# Patient Record
Sex: FEMALE | Race: WHITE | NOT HISPANIC OR LATINO | URBAN - METROPOLITAN AREA
[De-identification: names, ages, dates, MRNs, and addresses within clinical notes are randomized per-mention and may not be internally consistent; named-entity substitution may affect disease eponyms.]

---

## 2022-12-12 ENCOUNTER — APPOINTMENT (OUTPATIENT)
Dept: URBAN - METROPOLITAN AREA CLINIC 204 | Age: 37
Setting detail: DERMATOLOGY
End: 2022-12-12

## 2022-12-12 DIAGNOSIS — Z41.9 ENCOUNTER FOR PROCEDURE FOR PURPOSES OTHER THAN REMEDYING HEALTH STATE, UNSPECIFIED: ICD-10-CM

## 2022-12-12 PROCEDURE — OTHER SCITON BBL: OTHER

## 2022-12-12 PROCEDURE — OTHER SCITON MOXI: OTHER

## 2022-12-12 PROCEDURE — OTHER PLATELET RICH PLASMA INJECTION: OTHER

## 2022-12-12 ASSESSMENT — LOCATION DETAILED DESCRIPTION DERM
LOCATION DETAILED: LEFT FOREHEAD
LOCATION DETAILED: LEFT CENTRAL MALAR CHEEK
LOCATION DETAILED: RIGHT CENTRAL MALAR CHEEK
LOCATION DETAILED: SUPERIOR MID FOREHEAD

## 2022-12-12 ASSESSMENT — LOCATION SIMPLE DESCRIPTION DERM
LOCATION SIMPLE: LEFT FOREHEAD
LOCATION SIMPLE: RIGHT CHEEK
LOCATION SIMPLE: SUPERIOR FOREHEAD
LOCATION SIMPLE: LEFT CHEEK

## 2022-12-12 ASSESSMENT — LOCATION ZONE DERM: LOCATION ZONE: FACE

## 2022-12-12 NOTE — PROCEDURE: SCITON MOXI
Energy (J/Cm2): 20
Treatment Number: 1
Level 3
Laser Type: Fractionated 1927nm, thulium laser
Post-Care Instructions: I reviewed with the patient in detail post-care instructions.  Recommended diligent sun protection and sun avoidance.
Pre=procedure Text: After consent was obtained, the treatment areas were cleaned and treated using the parameters noted above.
Total Accumulated Energy (J): 4488
Detail Level: Detailed
Length Of Topical Anesthesia Application (Optional): 60 minutes
Topical Anesthesia?: 23% lidocaine, 7% tetracaine
Consent: Written consent obtained.  Risks were reviewed including but not limited to crusting, scabbing, blistering, scarring, darker or lighter pigmentary change, incomplete improvement, prolonged erythema and facial swelling, infection, and bleeding.
Price (Use Numbers Only, No Special Characters Or $): 400.00
% Per Pass: 3.3
Number Of Passes: 6

## 2022-12-12 NOTE — PROCEDURE: PLATELET RICH PLASMA INJECTION
Amount Injected At This Location In Cc (Will Not Render If 0): 0
Consent: Written consent obtained, risks reviewed including but not limited to pain, scarring, infection and incomplete improvement.  Patient understands the procedure is cosmetic in nature and will require out of pocket payment.
Location #1: under eyes
Amount Of Blood Collected (Optional - Include Units): 11ml
Site Of Venipuncture?: left ac
Detail Level: Zone
Post-Care Instructions: After the procedure, take precautions agains sun exposure. Do not apply sunscreen for 12 hours after the procedure. Do not apply make-up for 12 hours after the procedure. Avoid alcohol based toners for 10-14 days. After 2-3 days patients can return to their regular skin regimen.
Standard Default Technique For Making Prp: Blood collected and placed in centrifuge, opposite balance tube. Spin time of 10 minutes at default speed.
Depth In Mm (Will Not Render If 0): 0.2
Prp Centrifuge Text (Will Not Render If Blank): Spin time of 10 minutes at default speed utilizing Eclipse centrifuge.
Amount Injected At This Location In Cc (Will Not Render If 0): 6
Venipuncture Paragraph: An alcohol pad was applied to the venipuncture site. Venipuncture was performed using a butterfly needle. Pressure and a bandaid was applied to the site. No complications were noted.
Which Technique?: Default
Who Performed The Venipuncture?: Shannan Rothman
Price (Use Numbers Only, No Special Characters Or $): 200.00
Treatment Number (Optional): 1
Show Additional Techniques: No

## 2022-12-31 ENCOUNTER — RX ONLY (RX ONLY)
Age: 37
End: 2022-12-31

## 2022-12-31 RX ORDER — DOXYCYCLINE HYCLATE 100 MG/1
100MG CAPSULE, GELATIN COATED ORAL
Qty: 20 | Refills: 0 | Status: ERX | COMMUNITY
Start: 2022-12-31

## 2023-11-30 ENCOUNTER — APPOINTMENT (OUTPATIENT)
Dept: URBAN - METROPOLITAN AREA CLINIC 204 | Age: 38
Setting detail: DERMATOLOGY
End: 2023-11-30

## 2023-11-30 DIAGNOSIS — Z41.9 ENCOUNTER FOR PROCEDURE FOR PURPOSES OTHER THAN REMEDYING HEALTH STATE, UNSPECIFIED: ICD-10-CM

## 2023-11-30 PROCEDURE — OTHER SCITON MOXI: OTHER

## 2023-11-30 PROCEDURE — OTHER SCITON BBL: OTHER

## 2023-11-30 ASSESSMENT — LOCATION DETAILED DESCRIPTION DERM
LOCATION DETAILED: LEFT FOREHEAD
LOCATION DETAILED: SUPERIOR MID FOREHEAD

## 2023-11-30 ASSESSMENT — LOCATION SIMPLE DESCRIPTION DERM
LOCATION SIMPLE: LEFT FOREHEAD
LOCATION SIMPLE: SUPERIOR FOREHEAD

## 2023-11-30 ASSESSMENT — LOCATION ZONE DERM: LOCATION ZONE: FACE

## 2023-11-30 NOTE — PROCEDURE: SCITON MOXI
Energy (J/Cm2): 20
Treatment Number: 1
Level 3
Laser Type: Fractionated 1927nm, thulium laser
Post-Care Instructions: I reviewed with the patient in detail post-care instructions.  Recommended diligent sun protection and sun avoidance.
Pre=procedure Text: After consent was obtained, the treatment areas were cleaned and treated using the parameters noted above.
Total Accumulated Energy (J): 6238
Detail Level: Detailed
Length Of Topical Anesthesia Application (Optional): 60 minutes
Topical Anesthesia?: 23% lidocaine, 7% tetracaine
Consent: Written consent obtained.  Risks were reviewed including but not limited to crusting, scabbing, blistering, scarring, darker or lighter pigmentary change, incomplete improvement, prolonged erythema and facial swelling, infection, and bleeding.
Price (Use Numbers Only, No Special Characters Or $): 617
% Per Pass: 3.3
Number Of Passes: 6

## 2023-11-30 NOTE — PROCEDURE: SCITON BBL
Pulse Duration Units: milliseconds
Fluence (J/Cm2): 16
Repetition Rate (Hz): 10
Device Serial Number (Optional): [id number]
Fluence (J/Cm2): 25
Passes: 1
Additional Comments (Optional): 11 mm square adapter used for upper face
Pulse Duration: 20
Consent: Written consent obtained, risks reviewed including but not limited to crusting, scabbing, blistering, scarring, darker or lighter pigmentary change, bruising, and/or incomplete response.
Post-Care Instructions: I reviewed with the patient in detail post-care instructions. Patient should stay away from the sun and wear sun protection until treated areas are fully healed.
Price (Use Numbers Only, No Special Characters Or $): 100
Cooling ?: Yes
Hide Repetition Rate?: No
Anesthesia Volume In Cc: 0
Spot Size: Finesse Adapter Size: 15 x 15 mm square
Preprocedure Text: The treatment areas were thoroughly cleaned. Any exposed at risk hair that was not to be treated was covered in white paper tape. Clear ultrasound gel was applied to the treatment area. The area was treated with no immediate stacking of pulses.
Topical Anesthesia?: 23% lidocaine, 7% tetracaine
Post Procedure Text: The patient tolerated the procedure well. Ice-chilled washclothes were applied to the treatment area for comfort. Post care was reviewed with the patient.
Repetition Rate (Hz): 2
Cooling (In C): 15
Detail Level: Zone
Fluence (J/Cm2): 7
Treated Area: medium area
Length Of Topical Anesthesia Application (Optional): 60 minutes
Spot Size: Finesse Adapter Size: 15 x 45 mm (No Finesse Adapter)
Pulse Duration: 3